# Patient Record
Sex: MALE | Race: BLACK OR AFRICAN AMERICAN | NOT HISPANIC OR LATINO | Employment: FULL TIME | URBAN - METROPOLITAN AREA
[De-identification: names, ages, dates, MRNs, and addresses within clinical notes are randomized per-mention and may not be internally consistent; named-entity substitution may affect disease eponyms.]

---

## 2019-09-05 ENCOUNTER — THERAPY VISIT (OUTPATIENT)
Dept: PHYSICAL THERAPY | Facility: CLINIC | Age: 29
End: 2019-09-05
Payer: COMMERCIAL

## 2019-09-05 DIAGNOSIS — S99.911A INJURY OF RIGHT ANKLE, INITIAL ENCOUNTER: ICD-10-CM

## 2019-09-05 PROBLEM — S99.919A ANKLE INJURY: Status: ACTIVE | Noted: 2019-09-05

## 2019-09-05 PROCEDURE — 97161 PT EVAL LOW COMPLEX 20 MIN: CPT | Mod: GP | Performed by: PHYSICAL THERAPIST

## 2019-09-05 PROCEDURE — 97110 THERAPEUTIC EXERCISES: CPT | Mod: GP | Performed by: PHYSICAL THERAPIST

## 2019-09-05 NOTE — LETTER
[facility]  [address] [address]  [phone]    2019    Re: Arnold Desai   :   1990  MRN:  [id number]   REFERRING PHYSICIAN:   Bebo Gibson    [facility]    Date of Initial Evaluation:  19  Visits:  Rxs Used: 1  Reason for Referral:  Injury of right ankle, initial encounter    EVALUATION SUMMARY    [facility] Initial Evaluation  Subjective:  HPI                  Physical Therapy Initial Examination/Evaluation  2019    Arnold Desai is a 29 year old male referred to physical therapy by Dr. Marsh for treatment of right ankle s/p debridement-had a prior surgery in  (bone regrown leading to surgery) with Precautions/Restrictions/MD instructions WBAT  Pt reports MD restrictions are dictated by pain.  Last MD 2 weeks ago  Crutches for a few days post  Goals: squat able to run, avoid a stiff leg    Subjective:  DOI/onset: 2009 DOS: 19  Acute Injury or Gradual Onset?: Acute injury onset  Mechanism of Injury: basketball injury  Related PMH: hx of chronic right ankle problems Previous Treatment: Nothing Effect of prior treatment: good  Imaging: x-ray  Chief Complaint/Functional Limitations:   Minimal pain, difficult with stairs and see below in therapy evaluation codes   Pain: rest 0 /10, activity 4/10 Location: right ant ankle Frequency: Constant Described as: aching Alleviated by: Nothing Progression of Symptoms: Gradually getting better. Time of day when pain is worse: Activity related and Position related  Sleeping: No issues/uninterrupted   Occupation: [employer] employee  Job duties: prolonged sitting, prolonged standing, prolonged walking  Current HEP/exercise regimen: return to running, squatting  Patient's goals are see chief complaints see above    Other pertinent PMH/Red Flags: None   Barriers at home/work: None as reported by patient  Pertinent Surgical  History: hx of prior ankle debridement  Medications: None as reported by patient  General health as reported by patient: good  Return to MD:  unsure    Objective:  System   Obs: scar wound healing well, decr tissue mobility  Right ankle AROM   DF-PF 3-40 pain with pain at PF endrange  Left ankle DF-PF 10-55  SLS right 10 seconds left 30 seconds  Physical Exam  Gait fluid  2L squat: decr DF right, weight shift left  General   ROS    Assessment/Plan:    Patient is a 29 year old male with right side ankle complaints.    Patient has the following significant findings with corresponding treatment plan.                Diagnosis 1:  Right ankle  Pain -  hot/cold therapy, manual therapy, splint/taping/bracing/orthotics, self management, education and home program  Decreased ROM/flexibility - manual therapy and therapeutic exercise  Decreased strength - therapeutic exercise and therapeutic activities  Impaired balance - neuro re-education and therapeutic activities  Decreased function - therapeutic activities    Therapy Evaluation Codes:   1) History comprised of:   Personal factors that impact the plan of care:      None.    Comorbidity factors that impact the plan of care are:      None.     Medications impacting care: None.  2) Examination of Body Systems comprised of:   Body structures and functions that impact the plan of care:      Ankle.   Activity limitations that impact the plan of care are:      Bending, Sports, Squatting/kneeling and Stairs.  3) Clinical presentation characteristics are:   Stable/Uncomplicated.  4) Decision-Making    Low complexity using standardized patient assessment instrument and/or measureable assessment of functional outcome.  Cumulative Therapy Evaluation is: Low complexity.    Previous and current functional limitations:  (See Goal Flow Sheet for this information)    Short term and Long term goals: (See Goal Flow Sheet for this information)     Communication ability:  Patient appears to be  able to clearly communicate and understand verbal and written communication and follow directions correctly.  Treatment Explanation - The following has been discussed with the patient:   RX ordered/plan of care  Anticipated outcomes  Possible risks and side effects  This patient would benefit from PT intervention to resume normal activities.   Rehab potential is good.    Frequency:  1 X week, once daily  Duration:  for 12 weeks  Discharge Plan:  Achieve all LTG.  Independent in home treatment program.  Reach maximal therapeutic benefit.    Thank you for your referral.    INQUIRIES  Therapist: Luis Chiu PT   [facility] [address] [address]  Phone: [phone]  Fax: [phone]

## 2019-09-05 NOTE — PROGRESS NOTES
[facility] Initial Evaluation  Subjective:  [facility] [place]                  Physical Therapy Initial Examination/Evaluation  September 5, 2019    Arnold Desai is a 29 year old male referred to physical therapy by Dr. Marsh for treatment of right ankle s/p debridement-had a prior surgery in 2009 (bone regrown leading to surgery) with Precautions/Restrictions/MD instructions WBAT  Pt reports MD restrictions are dictated by pain.  Last MD 2 weeks ago  Crutches for a few days post  Goals: squat able to run, avoid a stiff leg    Subjective:  DOI/onset: 5/1/2009 DOS: 8/8/19  Acute Injury or Gradual Onset?: Acute injury onset  Mechanism of Injury: basketball injury  Related PMH: hx of chronic right ankle problems Previous Treatment: Nothing Effect of prior treatment: good  Imaging: x-ray  Chief Complaint/Functional Limitations:   Minimal pain, difficult with stairs and see below in therapy evaluation codes   Pain: rest 0 /10, activity 4/10 Location: right ant ankle Frequency: Constant Described as: aching Alleviated by: Nothing Progression of Symptoms: Gradually getting better. Time of day when pain is worse: Activity related and Position related  Sleeping: No issues/uninterrupted   Occupation: [employer] employee  Job duties: prolonged sitting, prolonged standing, prolonged walking  Current HEP/exercise regimen: return to running, squatting  Patient's goals are see chief complaints see above    Other pertinent PMH/Red Flags: None   Barriers at home/work: None as reported by patient  Pertinent Surgical History: hx of prior ankle debridement  Medications: None as reported by patient  General health as reported by patient: good  Return to MD:  unsure      Objective:  System   Obs: scar wound healing well, decr tissue mobility  Right ankle AROM   DF-PF 3-40 pain with pain at PF endrange  Left ankle DF-PF 10-55  SLS right 10 seconds left 30 seconds  Physical Exam  Gait fluid  2L squat: decr DF right, weight shift  left  General     ROS    Assessment/Plan:    Patient is a 29 year old male with right side ankle complaints.    Patient has the following significant findings with corresponding treatment plan.                Diagnosis 1:  Right ankle  Pain -  hot/cold therapy, manual therapy, splint/taping/bracing/orthotics, self management, education and home program  Decreased ROM/flexibility - manual therapy and therapeutic exercise  Decreased strength - therapeutic exercise and therapeutic activities  Impaired balance - neuro re-education and therapeutic activities  Decreased function - therapeutic activities    Therapy Evaluation Codes:   1) History comprised of:   Personal factors that impact the plan of care:      None.    Comorbidity factors that impact the plan of care are:      None.     Medications impacting care: None.  2) Examination of Body Systems comprised of:   Body structures and functions that impact the plan of care:      Ankle.   Activity limitations that impact the plan of care are:      Bending, Sports, Squatting/kneeling and Stairs.  3) Clinical presentation characteristics are:   Stable/Uncomplicated.  4) Decision-Making    Low complexity using standardized patient assessment instrument and/or measureable assessment of functional outcome.  Cumulative Therapy Evaluation is: Low complexity.    Previous and current functional limitations:  (See Goal Flow Sheet for this information)    Short term and Long term goals: (See Goal Flow Sheet for this information)     Communication ability:  Patient appears to be able to clearly communicate and understand verbal and written communication and follow directions correctly.  Treatment Explanation - The following has been discussed with the patient:   RX ordered/plan of care  Anticipated outcomes  Possible risks and side effects  This patient would benefit from PT intervention to resume normal activities.   Rehab potential is good.    Frequency:  1 X week, once  daily  Duration:  for 12 weeks  Discharge Plan:  Achieve all LTG.  Independent in home treatment program.  Reach maximal therapeutic benefit.    Please refer to the daily flowsheet for treatment today, total treatment time and time spent performing 1:1 timed codes.

## 2019-11-08 ENCOUNTER — THERAPY VISIT (OUTPATIENT)
Dept: PHYSICAL THERAPY | Facility: CLINIC | Age: 29
End: 2019-11-08
Payer: COMMERCIAL

## 2019-11-08 DIAGNOSIS — S99.911A INJURY OF RIGHT ANKLE, INITIAL ENCOUNTER: ICD-10-CM

## 2019-11-08 PROCEDURE — 97530 THERAPEUTIC ACTIVITIES: CPT | Mod: GP | Performed by: PHYSICAL THERAPIST

## 2019-11-08 PROCEDURE — 97110 THERAPEUTIC EXERCISES: CPT | Mod: GP | Performed by: PHYSICAL THERAPIST

## 2019-11-08 NOTE — PROGRESS NOTES
Assessment:  Patient demonstrates excessive limitations in TCJ mobility as seen with knee to wall test along with significant flexibility impairments of gastroc and soleus. Patient educated HEP to address impairments and patient compliant.     Plan:  Assess calf strength next treatment session

## 2019-11-28 ENCOUNTER — HOSPITAL ENCOUNTER (EMERGENCY)
Facility: CLINIC | Age: 29
Discharge: HOME | End: 2019-11-28
Payer: COMMERCIAL

## 2020-01-07 PROBLEM — S99.919A ANKLE INJURY: Status: RESOLVED | Noted: 2019-09-05 | Resolved: 2020-01-07

## 2020-01-07 NOTE — PROGRESS NOTES
Discharge Note  Progress reporting period is from Sept 5, 2019 to Nov 8, 2019.     Arnold failed to return for next follow up visit and current status is unknown.  Please see information below for last relevant information on current status.  Patient seen for Rxs Used: 2 visits.  SUBJECTIVE  Subjective changes noted by patient:  Subjective: compliant with HEP, started limping, I get a pain that comes out of knowhere, than the following day the foot goes back to normal, pain located around surgical incision and sometims along the achilles of (R) foot, history of ankle surgery, pain more of a sharp that is intermittent noticeable when increasing walking duration and after long periods of sitting, main goals limit ankle stiffness, symmetrical weightbearing, and decrease pain, patient reports last surgical procedure was in august of 2019  .  Current pain level is  .     Previous pain level was   .   Changes in function:  Yes (See Goal flowsheet attached for changes in current functional level)  Adverse reaction to treatment or activity: None    OBJECTIVE  Changes noted in objective findings:   Objective: Knee to wall test: (L) 22 deg (R) 10 deg, Observation: moderate swelling/edema para (R) ankle     ASSESSMENT/PLAN  Diagnosis: right ankle    Updated problem list and treatment plan:   Pain - HEP  Decreased ROM/flexibility - HEP  Decreased function - HEP  Decreased strength - HEP  Impaired muscle performance - HEP  Impaired balance - HEP  Decreased proprioception - HEP  STG/LTGs have been met or progress has been made towards goals:  Yes, please see goal flowsheet for most current information  Assessment of Progress: current status is unknown.  Last current status:     Self Management Plans:  HEP  I have re-evaluated this patient and find that the nature, scope, duration and intensity of the therapy is appropriate for the medical condition of the patient.  Arnold continues to require the following intervention to meet STG  and LTG's:  HEP.    Recommendations:  Discharge with current home program.  Patient to follow up with MD as needed.    Please refer to the daily flowsheet for treatment today, total treatment time and time spent performing 1:1 timed codes.

## 2024-01-19 ENCOUNTER — ANCILLARY PROCEDURE (OUTPATIENT)
Dept: GENERAL RADIOLOGY | Facility: CLINIC | Age: 34
End: 2024-01-19
Attending: PHYSICIAN ASSISTANT
Payer: COMMERCIAL

## 2024-01-19 ENCOUNTER — OFFICE VISIT (OUTPATIENT)
Dept: FAMILY MEDICINE | Facility: CLINIC | Age: 34
End: 2024-01-19
Payer: COMMERCIAL

## 2024-01-19 VITALS
TEMPERATURE: 97.9 F | RESPIRATION RATE: 16 BRPM | DIASTOLIC BLOOD PRESSURE: 84 MMHG | HEIGHT: 70 IN | HEART RATE: 59 BPM | OXYGEN SATURATION: 98 % | WEIGHT: 210.8 LBS | SYSTOLIC BLOOD PRESSURE: 128 MMHG | BODY MASS INDEX: 30.18 KG/M2

## 2024-01-19 DIAGNOSIS — M54.50 PAIN IN LEFT LUMBAR REGION OF BACK: ICD-10-CM

## 2024-01-19 DIAGNOSIS — M54.6 ACUTE LEFT-SIDED THORACIC BACK PAIN: Primary | ICD-10-CM

## 2024-01-19 DIAGNOSIS — M54.6 ACUTE LEFT-SIDED THORACIC BACK PAIN: ICD-10-CM

## 2024-01-19 LAB
ALBUMIN UR-MCNC: 100 MG/DL
APPEARANCE UR: CLEAR
BACTERIA #/AREA URNS HPF: ABNORMAL /HPF
BASOPHILS # BLD AUTO: 0 10E3/UL (ref 0–0.2)
BASOPHILS NFR BLD AUTO: 1 %
BILIRUB UR QL STRIP: NEGATIVE
COLOR UR AUTO: YELLOW
EOSINOPHIL # BLD AUTO: 0.1 10E3/UL (ref 0–0.7)
EOSINOPHIL NFR BLD AUTO: 2 %
ERYTHROCYTE [DISTWIDTH] IN BLOOD BY AUTOMATED COUNT: 11.9 % (ref 10–15)
GLUCOSE UR STRIP-MCNC: NEGATIVE MG/DL
HCT VFR BLD AUTO: 48.5 % (ref 40–53)
HGB BLD-MCNC: 16.5 G/DL (ref 13.3–17.7)
HGB UR QL STRIP: ABNORMAL
IMM GRANULOCYTES # BLD: 0 10E3/UL
IMM GRANULOCYTES NFR BLD: 0 %
KETONES UR STRIP-MCNC: ABNORMAL MG/DL
LEUKOCYTE ESTERASE UR QL STRIP: NEGATIVE
LYMPHOCYTES # BLD AUTO: 1.4 10E3/UL (ref 0.8–5.3)
LYMPHOCYTES NFR BLD AUTO: 31 %
MCH RBC QN AUTO: 30 PG (ref 26.5–33)
MCHC RBC AUTO-ENTMCNC: 34 G/DL (ref 31.5–36.5)
MCV RBC AUTO: 88 FL (ref 78–100)
MONOCYTES # BLD AUTO: 0.4 10E3/UL (ref 0–1.3)
MONOCYTES NFR BLD AUTO: 9 %
MUCOUS THREADS #/AREA URNS LPF: PRESENT /LPF
NEUTROPHILS # BLD AUTO: 2.6 10E3/UL (ref 1.6–8.3)
NEUTROPHILS NFR BLD AUTO: 57 %
NITRATE UR QL: NEGATIVE
PH UR STRIP: 5.5 [PH] (ref 5–7)
PLATELET # BLD AUTO: 260 10E3/UL (ref 150–450)
RBC # BLD AUTO: 5.5 10E6/UL (ref 4.4–5.9)
RBC #/AREA URNS AUTO: ABNORMAL /HPF
SP GR UR STRIP: 1.02 (ref 1–1.03)
UROBILINOGEN UR STRIP-ACNC: 0.2 E.U./DL
WBC # BLD AUTO: 4.5 10E3/UL (ref 4–11)
WBC #/AREA URNS AUTO: ABNORMAL /HPF

## 2024-01-19 PROCEDURE — 81001 URINALYSIS AUTO W/SCOPE: CPT | Performed by: PHYSICIAN ASSISTANT

## 2024-01-19 PROCEDURE — 72070 X-RAY EXAM THORAC SPINE 2VWS: CPT | Mod: TC | Performed by: RADIOLOGY

## 2024-01-19 PROCEDURE — 72100 X-RAY EXAM L-S SPINE 2/3 VWS: CPT | Mod: TC | Performed by: RADIOLOGY

## 2024-01-19 PROCEDURE — 85025 COMPLETE CBC W/AUTO DIFF WBC: CPT | Performed by: PHYSICIAN ASSISTANT

## 2024-01-19 PROCEDURE — 80048 BASIC METABOLIC PNL TOTAL CA: CPT | Performed by: PHYSICIAN ASSISTANT

## 2024-01-19 PROCEDURE — 36415 COLL VENOUS BLD VENIPUNCTURE: CPT | Performed by: PHYSICIAN ASSISTANT

## 2024-01-19 PROCEDURE — 87086 URINE CULTURE/COLONY COUNT: CPT | Performed by: PHYSICIAN ASSISTANT

## 2024-01-19 PROCEDURE — 99203 OFFICE O/P NEW LOW 30 MIN: CPT | Performed by: PHYSICIAN ASSISTANT

## 2024-01-19 ASSESSMENT — ENCOUNTER SYMPTOMS: BACK PAIN: 1

## 2024-01-19 ASSESSMENT — PAIN SCALES - GENERAL: PAINLEVEL: NO PAIN (0)

## 2024-01-19 NOTE — PROGRESS NOTES
"  Assessment & Plan     (M54.6) Acute left-sided thoracic back pain  (primary encounter diagnosis)  Comment:  Patient with acute left-sided thoracic back pain.  Consider broad differential diagnosis including kidney stones, renal pathology, pulmonary etiology, musculoskeletal, amongst others.  High suspicion this is musculoskeletal etiology.  Patient has been seated on an uneven couch at home.   we will check urinalysis.  Discussed potential of x-rays of the lumbar and thoracic spine.  If urinalysis returns positive for hematuria possibility of CT stone protocol.  If any worsening or new concerns to be seen again more urgently.  No loss of bowel or bladder control and patellar reflexes 2+.  Low suspicion of cauda equina or other more concerning process.  Discussed symptomatic care with lidocaine patches and over-the-counter medication  Plan: UA Macroscopic with reflex to Microscopic and         Culture - Lab Collect, Basic metabolic panel          (Ca, Cl, CO2, Creat, Gluc, K, Na, BUN), CBC         with platelets and differential, XR Lumbar         Spine 2/3 Views, XR Thoracic Spine 2 Views,         Physical Therapy Referral            (M54.50) Pain in left lumbar region of back  Comment:   Plan: Physical Therapy Referral                    BMI  Estimated body mass index is 30.25 kg/m  as calculated from the following:    Height as of this encounter: 1.778 m (5' 10\").    Weight as of this encounter: 95.6 kg (210 lb 12.8 oz).           Last Barrett is a 34 year old, presenting for the following health issues:  Back Pain (Left sided flank pain. This has been going on for about a week. )    History of Present Illness       Back Pain:  He presents for follow up of back pain. Patient's back pain is a new problem.    Original cause of back pain: not sure  First noticed back pain: in the last week  Patient feels back pain: comes and goesLocation of back pain:  Left lower back  Description of back pain: other  Back " pain spreads: nowhere    Since patient first noticed back pain, pain is: always present, but gets better and worse  Does back pain interfere with his job:  Not applicable  On a scale of 1-10 (10 being the worst), patient describes pain as:  5  What makes back pain worse: other   Acupuncture: not tried  Acetaminophen: not tried  Activity or exercise: not helpful  Chiropractor:  Not tried  Cold: not tried  Heat: not tried  Massage: not tried  Muscle relaxants: not tried  NSAIDS: not tried  Opioids: not tried  Physical Therapy: not tried  Rest: not tried  Steroid Injection: not tried  Stretching: not tried  Surgery: not tried  TENS unit: not tried  Topical pain relievers: not tried  Other healthcare providers patient is seeing for back pain: Other    He eats 2-3 servings of fruits and vegetables daily.He consumes 1 sweetened beverage(s) daily.He exercises with enough effort to increase his heart rate 20 to 29 minutes per day.  He exercises with enough effort to increase his heart rate 3 or less days per week.   He is taking medications regularly.  Patient presents for evaluation of left sided back pain.  Has been ongoing over the last week or so.  There is no direct trauma or injury correlated with onset of symptoms.  He notes that he has been spending a large amount of time on a couch with uneven cushions and thought that may be contributing to symptoms.  At worst this pain is 3/10 in severity.  Does not radiate down his leg.  No exacerbating factors.  It waxes and wanes at times he does not have any pain.  Patient is concerned that there may be kidney involvement with his discomfort.  He has not had any hematuria or dysuria.  Patient had completed a 4-day water fast and thought that may have caused some kidney issues.  He notes history of lower back pain in the past, however, no prior surgeries.  No loss of bowel or bladder control.  No fevers.      Objective    /84   Pulse 59   Temp 97.9  F (36.6  C)  "(Tympanic)   Resp 16   Ht 1.778 m (5' 10\")   Wt 95.6 kg (210 lb 12.8 oz)   SpO2 98%   BMI 30.25 kg/m    Body mass index is 30.25 kg/m .  Physical Exam   GENERAL: alert and no distress  RESP: lungs clear to auscultation - no rales, rhonchi or wheezes  CV: regular rate and rhythm, normal S1 S2, no S3 or S4, no murmur, click or rub, no peripheral edema  ABDOMEN: soft, nontender, no hepatosplenomegaly, no masses and bowel sounds normal  MS: No obvious bony abnormalities.  No midline  thoracic or lumbar spinous process tenderness.  Diffuse tenderness over the left paraspinal musculature.  No overlying erythema or warmth  NEURO: Normal strength and tone, sensory exam grossly normal, mentation intact, and DTR's normal and symmetric patellar            Signed Electronically by: Bebo Frazier PA-C    "

## 2024-01-19 NOTE — COMMUNITY RESOURCES LIST (ENGLISH)
01/19/2024   [facility]  N/A  For questions about this resource list or additional care needs, please contact your primary care clinic or care manager.  Phone: [phone]   Email: N/A   Address: [facility] [place] [address]   Hours: N/A        Transportation       Free or low-cost transportation  1  [facility] - Bus Passes - Free or low-cost transportation Distance: 0.55 miles      In-Person   [address] [place] [address]  Language: English  Hours: Tue 9:30 AM - 11:30 AM , Thu 9:30 AM - 11:30 AM  Fees: Free   Phone: (114) 581-4449 Email: info@[employer] Website: http://www.[employer]/     2  [facility] Distance: 0.61 miles      In-Person   [address] [place] [address]  Language: English  Hours: Mon - Wed 9:30 AM - 12:00 PM , Mon - Wed 1:00 PM - 2:00 PM Appt. Only  Fees: Free   Phone: (370) 291-3600 Email: info@[employer].org Website: http://www.[employer].org/     Transportation to medical appointments  3  [facility] Distance: 7.59 miles      In-Person   [address] [address]  Language: English  Hours: Mon - Sun Open 24 Hours  Fees: Insurance, Self Pay   Phone: (682) 722-1831 Website: https://www.[facility]care[facility].com/jaylan/?L=true     4  [facility] ([facility]) Distance: 7.6 miles      In-Person   [address] [place] [address]  Language: Spanish, Nepali, English, Gujarati, Yennifer, Tamazight, Portuguese, Czech, Maori, Croatian  Hours: Mon - Wed 9:00 AM - 5:00 PM , Thu 12:00 PM - 6:00 PM , Fri 9:00 AM - 5:00 PM , Sun 10:30 AM - 2:00 PM Appt. Only  Fees: Free   Phone: (926) 406-6644 Email: info@[facility].org Website: https://www.sewa-aifw.org/          Important Numbers & Websites       Emergency Services   [address] [address]  Poison Control   (523) 223-6950  Suicide Prevention Lifeline   (298) 283-9262 (TALK)  Child Abuse Hotline   (156) 299-4006 (4-A-Child)  Sexual  Assault Hotline   (735) 262-4438 (HOPE)  National Runaway Safeline   (262) 830-6253 (RUNAWAY)  All-Options Talkline   (179) 874-7013  Substance Abuse Referral   (286) 790-7607 (HELP)

## 2024-01-20 LAB
ANION GAP SERPL CALCULATED.3IONS-SCNC: 11 MMOL/L (ref 7–15)
BUN SERPL-MCNC: 15.5 MG/DL (ref 6–20)
CALCIUM SERPL-MCNC: 9.7 MG/DL (ref 8.6–10)
CHLORIDE SERPL-SCNC: 103 MMOL/L (ref 98–107)
CREAT SERPL-MCNC: 1.14 MG/DL (ref 0.67–1.17)
DEPRECATED HCO3 PLAS-SCNC: 25 MMOL/L (ref 22–29)
EGFRCR SERPLBLD CKD-EPI 2021: 88 ML/MIN/1.73M2
GLUCOSE SERPL-MCNC: 106 MG/DL (ref 70–99)
POTASSIUM SERPL-SCNC: 4.8 MMOL/L (ref 3.4–5.3)
SODIUM SERPL-SCNC: 139 MMOL/L (ref 135–145)

## 2024-01-21 LAB — BACTERIA UR CULT: NO GROWTH

## 2024-01-23 ENCOUNTER — TELEPHONE (OUTPATIENT)
Dept: FAMILY MEDICINE | Facility: CLINIC | Age: 34
End: 2024-01-23
Payer: COMMERCIAL

## 2024-01-23 NOTE — TELEPHONE ENCOUNTER
Please contact patient.     Urine culture without evidence of infection. No bacterial growth. If still having symptoms or new concerns needs to be seen again.     Kidney function is normal.   Thank you,   Bebo Frazier PA-C

## 2024-01-23 NOTE — TELEPHONE ENCOUNTER
Called patient and relayed the provider's message.  Patient verbalized understanding.  Denied any further questions at this time.      Kristina Kjellberg, MSN, RN

## 2024-01-23 NOTE — TELEPHONE ENCOUNTER
Patient calling in to get clarification on his urine labs from 1/19/24.  States he saw some abnormals on there and wants to know what they mean.    No provider interpretation yet, please interpret urine results for patient.      Kristina Kjellberg, MSN, RN

## 2024-02-10 ENCOUNTER — HEALTH MAINTENANCE LETTER (OUTPATIENT)
Age: 34
End: 2024-02-10

## 2024-07-17 ENCOUNTER — TELEPHONE (OUTPATIENT)
Dept: FAMILY MEDICINE | Facility: CLINIC | Age: 34
End: 2024-07-17
Payer: COMMERCIAL

## 2024-07-17 NOTE — TELEPHONE ENCOUNTER
Forms/Letter Request    Type of form/letter: OTHER: MISSING WORK        Do we have the form/letter: Yes: STATEMENT FROM PROVIDER (LAST SAW DR. BURTON 01/19/2024)    Who is the form from? N/A PROVIDER     Where did/will the form come from? N/A    When is form/letter needed by: ASAP     How would you like the form/letter returned: PT DOES NOT NEED STATEMENT SENT ANYWHERE, PT IS DROPPING [place] FORM SO THAT WORK CAN CONTACT PROVIDER.     Patient Notified form requests are processed in 5-7 business days:Yes    Could we send this information to you in [facility] [employer] or would you prefer to receive a phone call?:   Patient would like to be contacted via [employer]

## 2024-07-18 ENCOUNTER — OFFICE VISIT (OUTPATIENT)
Dept: FAMILY MEDICINE | Facility: CLINIC | Age: 34
End: 2024-07-18
Payer: COMMERCIAL

## 2024-07-18 VITALS
OXYGEN SATURATION: 95 % | WEIGHT: 215 LBS | DIASTOLIC BLOOD PRESSURE: 83 MMHG | BODY MASS INDEX: 30.85 KG/M2 | TEMPERATURE: 97.5 F | RESPIRATION RATE: 18 BRPM | HEART RATE: 55 BPM | SYSTOLIC BLOOD PRESSURE: 135 MMHG

## 2024-07-18 DIAGNOSIS — L02.421 FURUNCLE OF RIGHT AXILLA: Primary | ICD-10-CM

## 2024-07-18 PROCEDURE — G2211 COMPLEX E/M VISIT ADD ON: HCPCS | Performed by: FAMILY MEDICINE

## 2024-07-18 PROCEDURE — 99213 OFFICE O/P EST LOW 20 MIN: CPT | Performed by: FAMILY MEDICINE

## 2024-07-18 NOTE — PROGRESS NOTES
"  Assessment & Plan     Furuncle of right axilla  Completed paperwork for patient's employment.  It should be scanned to the chart.  This small abscess has drained and is now essentially resolved.  Discussed potential for recurrence.  Recommended follow-up if there are any further concerns.    BMI  Estimated body mass index is 30.85 kg/m  as calculated from the following:    Height as of 1/19/24: 1.778 m (5' 10\").    Weight as of this encounter: 97.5 kg (215 lb).             Last Barrett is a 34 year old, presenting for the following health issues:  Musculoskeletal Problem (Pt stated boil under armpit had since 6/22 but it gone couple days it burst but need work clearance to be able to go back )      7/18/2024     2:14 PM   Additional Questions   Roomed by Deepthi     Musculoskeletal Problem    History of Present Illness       Reason for visit:  Boil under my right armpit    He eats 0-1 servings of fruits and vegetables daily.He consumes 1 sweetened beverage(s) daily.He exercises with enough effort to increase his heart rate 9 or less minutes per day.  He exercises with enough effort to increase his heart rate 3 or less days per week.   He is taking medications regularly.     Patient here essentially for clearance to go back to work.  Was out of work for an extended time due to a boil under his right arm.  Has cell phone pictures to document this.  Did not seek care.  Needs paperwork completed.  He had pain even resting his arm in a neutral position when it was there.  That lesion seem to drain.  He applied external creams and cleaned it often.  Now he feels better and would like to go back to work though his work will not allow him to until he is seen by a physician.        Objective    /83 (BP Location: Right arm, Patient Position: Sitting, Cuff Size: Adult Regular)   Pulse 55   Temp 97.5  F (36.4  C) (Temporal)   Resp 18   Wt 97.5 kg (215 lb)   SpO2 95%   BMI 30.85 kg/m    Body mass index is " 30.85 kg/m .  Physical Exam   GENERAL: alert, not distressed  CHEST: clear, no rales, rhonchi, or wheezes  CARDIAC: regular without murmur, gallop, or rub  Chest wall: Likely prior small abscess.  Right axilla.  Appears well-healed now.        Prior to immunization administration, verified patients identity using patient s name and date of birth. Please see Immunization Activity for additional information.     Screening Questionnaire for Adult Immunization    Are you sick today?   Don't Know   Do you have allergies to medications, food, a vaccine component or latex?   No   Have you ever had a serious reaction after receiving a vaccination?   No   Do you have a long-term health problem with heart, lung, kidney, or metabolic disease (e.g., diabetes), asthma, a blood disorder, no spleen, complement component deficiency, a cochlear implant, or a spinal fluid leak?  Are you on long-term aspirin therapy?   Don't Know   Do you have cancer, leukemia, HIV/AIDS, or any other immune system problem?   No   Do you have a parent, brother, or sister with an immune system problem?   No   In the past 3 months, have you taken medications that affect  your immune system, such as prednisone, other steroids, or anticancer drugs; drugs for the treatment of rheumatoid arthritis, Crohn s disease, or psoriasis; or have you had radiation treatments?   No   Have you had a seizure, or a brain or other nervous system problem?   No   During the past year, have you received a transfusion of blood or blood    products, or been given immune (gamma) globulin or antiviral drug?   No   For women: Are you pregnant or is there a chance you could become       pregnant during the next month?   No   Have you received any vaccinations in the past 4 weeks?   No     Immunization questionnaire answers were all negative.      Patient instructed to remain in clinic for 15 minutes afterwards, and to report any adverse reactions.     Screening performed by Deepthi  Amandeep on 7/18/2024 at 2:19 PM.   Signed Electronically by: Familia Drew MD

## 2024-07-18 NOTE — TELEPHONE ENCOUNTER
Called and spoke to patient. He has had a boil in [facility] and said he could not drive to work. He wants a provider to say he can go back to work, since it has healed up and is gone. He has not seen a provider for this. He said he has been taking pictures of it. He said he was off work for 2-3 weeks, he said he did not come in to be seen because he could not drive. I informed him that a provider would not do a note to go back to work, and approve his time off if he was not seen in the clinic. Patient argued with me that he could not drive to be seen and he wanted to come in. He wants an appointment for this today. We do not have any openings for today and told him I was not hannah if urgent car would do this or not. I offered him an appointment for tomorrow, he declined and said he needs to be seen today. He also wants to be seen closer to his house, he lived in [place]. I asked him to call scheduling back to see if there is an opening closer to him. He said to come to [place] it is 3-35 minutes away from him.Margi Jon [facility]

## 2024-08-01 ENCOUNTER — TELEPHONE (OUTPATIENT)
Dept: FAMILY MEDICINE | Facility: CLINIC | Age: 34
End: 2024-08-01
Payer: COMMERCIAL

## 2024-08-01 NOTE — TELEPHONE ENCOUNTER
Forms/Letter Request    Type of form/letter: OTHER: return to work       Do we have the form/letter: Yes:     Who is the form from? Patient sent via [employer].    Where did/will the form come from? form was sent via [employer]    When is form/letter needed by: asap    How would you like the form/letter returned: Fax : [phone]

## 2024-08-02 NOTE — TELEPHONE ENCOUNTER
I can write on the form what I knew from our visit.  There was a form done at that visit.  It is not yet scanned, but I did review it in the file cabinet.

## 2024-08-05 NOTE — TELEPHONE ENCOUNTER
Left message on vm per provider,if patient needs the date changed on his form he will need to make an appointment with the provider.

## 2024-08-05 NOTE — TELEPHONE ENCOUNTER
Forms/Letter Request    Type of form/letter: OTHER: RETURN TO WORK 8/8/24. If provider can make correction to the return to work date from form and returned to work date from July.        Do we have the form/letter: Yes(?)  Please complete the return to work date: 8/8/24     When is form/letter needed by: 8/5?    How would you like the form/letter returned: Fax : Bottom of form: Fax: [phone]    Patient Notified form requests are processed in 5-7 business days:Yes    Could we send this information to you in [employer] or would you prefer to receive a phone call?:   Patient would prefer a phone call   Okay to leave a detailed message?: Yes at Cell number on file:    Telephone Information:   Mobile [phone]

## 2024-08-05 NOTE — TELEPHONE ENCOUNTER
Forms/Letter Request    Type of form/letter: OTHER: Patient came in to explain why he needs Dr Drew to update his return to work from from 7/19/24 to 8/8/24. Patient states he was ready to go back on 7/19 but [facility] the company the [employer] employees to take care of workers leave etc. Hadn't finished processing the paper work. So  they wouldn't clear him to go back to work.Now he needs to be covered for an extra week.    Do we have the form/letter: Yes:     Who is the form from? [employer] (if other please explain)    Where did/will the form come from? form was faxed in    When is form/letter needed by: It has been taken care of. Dr Drew changed the date, I gave patient a copy of the  corrected forms and I faxed corrected form back to [place] [phone]

## 2024-08-07 NOTE — TELEPHONE ENCOUNTER
8/5/24 patient came in and I talked to him at the . He explained why he needed the change of return to work dates( it seems the insurance company took to long to process his paper work ,so in order for him to be covered we needed to correct the date. Dr Randall agreed and corrected the date. I faxed a copy to [facility] and gave a copy to the patient,

## 2024-08-22 ENCOUNTER — PATIENT OUTREACH (OUTPATIENT)
Dept: CARE COORDINATION | Facility: CLINIC | Age: 34
End: 2024-08-22
Payer: COMMERCIAL

## 2024-08-22 NOTE — PROGRESS NOTES
Clinic Care Coordination Contact  Program:   [facility]: [place]     Renewal:UCARE   Date Applied:      ABAD Outreach:   8/22/24: 1st outreach attempt. Left a message on voicemail with call back information and requested return call.  Plan: CTA will call again within 2 weeks.  Anastasia Weiner  Care   [facility]  Clinic Care Coordination  [phone]      Health Insurance:        Referral/Screening:

## 2024-09-24 ENCOUNTER — PATIENT OUTREACH (OUTPATIENT)
Dept: CARE COORDINATION | Facility: CLINIC | Age: 34
End: 2024-09-24
Payer: COMMERCIAL

## 2024-09-24 NOTE — PROGRESS NOTES
Clinic Care Coordination Contact  Program:   [facility]: [place]     Renewal:UCARE   Date Applied:      FRCYNTHIA Outreach:   9/24/24: 2nd outreach attempt. Left message on voicemail indicating last outreach attempt. CTA left [facility] number for renewal follow up.  Plan: CTA will no longer make outreach  Anastasia Perez   JORGE LUIS [facility]  Clinic Care Coordination  [phone]     8/22/24: 1st outreach attempt. Left a message on voicemail with call back information and requested return call.  Plan: CTA will call again within 2 weeks.  Anastasia Perez   [facility]  Clinic Care Coordination  [phone]      Health Insurance:        Referral/Screening:

## 2025-03-02 ENCOUNTER — HEALTH MAINTENANCE LETTER (OUTPATIENT)
Age: 35
End: 2025-03-02

## 2025-04-21 ENCOUNTER — LAB (OUTPATIENT)
Dept: LAB | Facility: CLINIC | Age: 35
End: 2025-04-21
Payer: COMMERCIAL

## 2025-04-21 DIAGNOSIS — Z11.4 SCREENING FOR HIV (HUMAN IMMUNODEFICIENCY VIRUS): Primary | ICD-10-CM

## 2025-04-21 DIAGNOSIS — Z11.59 NEED FOR HEPATITIS C SCREENING TEST: ICD-10-CM
